# Patient Record
Sex: MALE | Race: WHITE | NOT HISPANIC OR LATINO | URBAN - METROPOLITAN AREA
[De-identification: names, ages, dates, MRNs, and addresses within clinical notes are randomized per-mention and may not be internally consistent; named-entity substitution may affect disease eponyms.]

---

## 2022-02-25 ENCOUNTER — EMERGENCY (EMERGENCY)
Facility: HOSPITAL | Age: 42
LOS: 1 days | Discharge: HOME | End: 2022-02-25
Attending: PERSONAL EMERGENCY RESPONSE ATTENDANT
Payer: MEDICARE

## 2022-02-25 VITALS
WEIGHT: 242.95 LBS | RESPIRATION RATE: 18 BRPM | OXYGEN SATURATION: 98 % | DIASTOLIC BLOOD PRESSURE: 76 MMHG | SYSTOLIC BLOOD PRESSURE: 117 MMHG | HEART RATE: 109 BPM | TEMPERATURE: 98 F | HEIGHT: 71 IN

## 2022-02-25 PROCEDURE — 99284 EMERGENCY DEPT VISIT MOD MDM: CPT | Mod: GC

## 2022-02-25 NOTE — ED ADULT NURSE NOTE - OBJECTIVE STATEMENT
42 y/o male with hx of Asperger's presents to ED with c/o low hemoglobin. Pt was seen by his PCP today d/t 1 week of weakness, HA, dizziness, and fatigue. Pt's PCP called him tonight referring him to the ED because his hgb level was 6.6  Pt states he had nausea and vomiting today. He denies hematuria, bloody stools, CP, SOB, palpitations, fever, chills. Pt is a&ox4, spontaneous respirations and equal chest rise. Pt appears pale. He is agitated and irritable at baseline d/t Asperger's hx. Mother is at bedside.

## 2022-02-25 NOTE — ED PROVIDER NOTE - NSPTACCESSSVCSAPPTDETAILS_ED_ALL_ED_FT
Diagnosis: Anemia w/ unknown source. Colonoscopy outpatient required. Appt within 3 days.  Also appt with hematology within 3 days.

## 2022-02-25 NOTE — ED PROVIDER NOTE - NSFOLLOWUPCLINICS_GEN_ALL_ED_FT
[facility]  Hematology/Oncology  [address] [id number]  Phone: [phone]  Fax:

## 2022-02-25 NOTE — ED PROVIDER NOTE - PROGRESS NOTE DETAILS
Elias IVY: Pt is stable and ready for discharge. Strict return precautions given. All questions answered. Pt will f/u w/ hematologist and GI doctor. Alan, PGY2: Received care upon signout:  40 y/o male w/ PMH GERD coming in w/ symptomatic anemia found to have an outpatient Hg 6.2 and subsequent repeat ones here are similar. Received one unit of prbc and is awaiting 2nd unit. Denying any changes in stool and is refusing rectal exam. Alan, PGY2: Repeat Hg is 7.4. Discussed w/ patient and patient's mother regarding results and recommending inpatient eval for anemia but they are prefer to seek outpatient management. They follow an outpatient GI and can see their PMD this week. Provided patient with strict return precautions; including worsening fatigue, SOB, chest pain and any changes in his BM. Patient and mother understand and agree w/ plan.

## 2022-02-25 NOTE — ED PROVIDER NOTE - NS ED ROS FT
CONSTITUTIONAL: No fever, weight loss, or fatigue  EYES: No eye pain, visual disturbances, or discharge  ENMT:  No difficulty hearing, tinnitus, vertigo; No sinus or throat pain  NECK: No pain or stiffness  RESPIRATORY: No cough, wheezing, chills or hemoptysis; No shortness of breath  CARDIOVASCULAR: No chest pain, palpitations, dizziness, or leg swelling  GASTROINTESTINAL: No abdominal or epigastric pain. +nausea, +vomiting, no hematemesis; No diarrhea or constipation. No melena or hematochezia.  GENITOURINARY: No dysuria, frequency, hematuria, or incontinence  NEUROLOGICAL: + headaches, no memory loss, loss of strength, numbness, or tremors  SKIN: No itching, burning, rashes, or lesions

## 2022-02-25 NOTE — ED PROVIDER NOTE - NSFOLLOWUPINSTRUCTIONS_ED_ALL_ED_FT
Discharge instructions:    - Please follow up with your Primary Care Doctor within the next 24-48 hours.    - We recommend you follow up with your Gastroenterologist within the next 3 days. Our discharge lounge will contact you.    - We also recommend you follow up with a Hematologist for further evaluation.     -  Take any prescribed medications as instructed:         SEEK IMMEDIATE MEDICAL CARE IF YOU HAVE ANY OF THE FOLLOWING SYMPTOMS: extreme weakness/chest pain/shortness of breath, black or bloody stools, vomiting blood, fainting, fever, or any signs of dehydration.

## 2022-02-25 NOTE — ED PROVIDER NOTE - ATTENDING CONTRIBUTION TO CARE
Attending MD Fontanez.  Agree with above.  PT is a 40 yo male with pmhx of aspberger's and GERD presenting to ED wiht 2 wk hx of generalized malaise, intermittent headache, nausea and vomiting.  Seen at UC today with Hgb of 6.2. Denies diarrhea, constipation, hematochezia, bloody emesis, hematuria.  Exam non-actionable.  PT declining rectal exam but denies overt bleeding from rectum.  No petechia/rash noted.  Planned labs, likely transfusion.

## 2022-02-25 NOTE — ED PROVIDER NOTE - OBJECTIVE STATEMENT
42 y/o male w/ PMH GERD c/o 2 week history of generalized malaise, intermittent headache, nausea, and 3 episodes nausea and vomiting. Was seen at urgent care today with a hemoglobin of 6.2. Denies fevers, chills, dizziness, chest pain, SOB, abdominal pain, dysuria, hematuria, dyschezia, and hematochezia.

## 2022-02-25 NOTE — ED ADULT NURSE NOTE - NSIMPLEMENTINTERV_GEN_ALL_ED
Implemented All Universal Safety Interventions:  [place] to call system. Call bell, personal items and telephone within reach. Instruct patient to call for assistance. Room bathroom lighting operational. Non-slip footwear when patient is off stretcher. Physically safe environment: no spills, clutter or unnecessary equipment. Stretcher in lowest position, wheels locked, appropriate side rails in place.

## 2022-02-25 NOTE — ED ADULT NURSE NOTE - FINAL NURSING ELECTRONIC SIGNATURE
Aperture Size (Optional): C
Duration Of Freeze Thaw-Cycle (Seconds): 10
Post-Care Instructions: I reviewed with the patient in detail post-care instructions. Patient is to wear sunprotection, and avoid picking at any of the treated lesions. Pt may apply Vaseline to crusted or scabbing areas.
Render Post-Care Instructions In Note?: yes
Detail Level: Detailed
Medical Necessity Clause: This procedure was medically necessary because the lesions that were treated were:
Medical Necessity Information: It is in your best interest to select a reason for this procedure from the list below. All of these items fulfill various CMS LCD requirements except the new and changing color options.
Add 52 Modifier (Optional): no
Number Of Freeze-Thaw Cycles: 2 freeze-thaw cycles
Consent: The patient's consent was obtained including but not limited to risks of crusting, scabbing, blistering, scarring, darker or lighter pigmentary change, recurrence, incomplete removal and infection.
26-Feb-2022 09:55

## 2022-02-25 NOTE — ED PROVIDER NOTE - PHYSICAL EXAMINATION
GENERAL: well appearing in no acute distress, non-toxic appearing  HEAD: normocephalic, atraumatic  HEENT: normal conjunctiva, oral mucosa moist, uvula midline, no tonsilar exudates, neck supple, no JVD  CARDIAC: regular rate and rhythm, normal S1S2, no appreciable murmurs  PULM: normal breath sounds, clear to ascultation bilaterally, no rales, rhonchi, wheezing  GI: abdomen nondistended, soft, nontender, no guarding, rebound tenderness  : no CVA tenderness b/l, no suprapubic tenderness  NEURO: no focal motor or sensory deficits, CN2-12 intact, normal speech, PERRLA, EOMI, antalgic gait that is normal according to mother, AAOx3  MSK: no peripheral edema, no calf tenderness b/l  SKIN: extremities warm, no visible rashes GENERAL: well appearing in no acute distress, non-toxic appearing  HEAD: normocephalic, atraumatic  HEENT: normal conjunctiva, oral mucosa moist, uvula midline, no tonsilar exudates, neck supple, no JVD  CARDIAC: regular rate and rhythm, normal S1S2, no appreciable murmurs  PULM: normal breath sounds, clear to ascultation bilaterally, no rales, rhonchi, wheezing  GI: abdomen nondistended, soft, nontender, no guarding, rebound tenderness  : no CVA tenderness b/l, no suprapubic tenderness  NEURO: no focal motor or sensory deficits, CN2-12 intact, normal speech, PERRLA, EOMI, antalgic gait that is normal according to mother, AAOx3  MSK: no peripheral edema, no calf tenderness b/l  SKIN: extremities warm, no visible rashes  *Pt declined a rectal exam

## 2022-02-25 NOTE — ED PROVIDER NOTE - CLINICAL SUMMARY MEDICAL DECISION MAKING FREE TEXT BOX
40 y/o male w/ PMH GERD c/o 2 week history of constitutional symptoms w/ hgb 6.2. Will screen for anemia, electrolyte, and type and screen for blood transfusion if necessary and reassess.

## 2022-02-25 NOTE — ED PROVIDER NOTE - PATIENT PORTAL LINK FT
You can access the FollowMyHealth Patient Portal offered by [facility] by registering at the following website: [email or web address]. By joining [facility]’s FollowMyHealth portal, you will also be able to view your health information using other applications (apps) compatible with our system.

## 2022-02-26 VITALS
HEART RATE: 83 BPM | OXYGEN SATURATION: 98 % | TEMPERATURE: 98 F | SYSTOLIC BLOOD PRESSURE: 115 MMHG | DIASTOLIC BLOOD PRESSURE: 75 MMHG | RESPIRATION RATE: 18 BRPM

## 2022-02-26 LAB
ALBUMIN SERPL ELPH-MCNC: 4.5 G/DL — SIGNIFICANT CHANGE UP (ref 3.3–5)
ALP SERPL-CCNC: 60 U/L — SIGNIFICANT CHANGE UP (ref 40–120)
ALT FLD-CCNC: 25 U/L — SIGNIFICANT CHANGE UP (ref 10–45)
ANION GAP SERPL CALC-SCNC: 12 MMOL/L — SIGNIFICANT CHANGE UP (ref 5–17)
ANISOCYTOSIS BLD QL: SLIGHT — SIGNIFICANT CHANGE UP
APPEARANCE UR: ABNORMAL
APTT BLD: 28.5 SEC — SIGNIFICANT CHANGE UP (ref 27.5–35.5)
AST SERPL-CCNC: 15 U/L — SIGNIFICANT CHANGE UP (ref 10–40)
BACTERIA # UR AUTO: NEGATIVE — SIGNIFICANT CHANGE UP
BASOPHILS # BLD AUTO: 0.04 K/UL — SIGNIFICANT CHANGE UP (ref 0–0.2)
BASOPHILS # BLD AUTO: 0.05 K/UL — SIGNIFICANT CHANGE UP (ref 0–0.2)
BASOPHILS # BLD AUTO: 0.09 K/UL — SIGNIFICANT CHANGE UP (ref 0–0.2)
BASOPHILS NFR BLD AUTO: 0.4 % — SIGNIFICANT CHANGE UP (ref 0–2)
BASOPHILS NFR BLD AUTO: 0.5 % — SIGNIFICANT CHANGE UP (ref 0–2)
BASOPHILS NFR BLD AUTO: 0.9 % — SIGNIFICANT CHANGE UP (ref 0–2)
BILIRUB SERPL-MCNC: 0.3 MG/DL — SIGNIFICANT CHANGE UP (ref 0.2–1.2)
BILIRUB UR-MCNC: NEGATIVE — SIGNIFICANT CHANGE UP
BLD GP AB SCN SERPL QL: NEGATIVE — SIGNIFICANT CHANGE UP
BUN SERPL-MCNC: 20 MG/DL — SIGNIFICANT CHANGE UP (ref 7–23)
CALCIUM SERPL-MCNC: 9.2 MG/DL — SIGNIFICANT CHANGE UP (ref 8.4–10.5)
CHLORIDE SERPL-SCNC: 105 MMOL/L — SIGNIFICANT CHANGE UP (ref 96–108)
CO2 SERPL-SCNC: 23 MMOL/L — SIGNIFICANT CHANGE UP (ref 22–31)
COLOR SPEC: YELLOW — SIGNIFICANT CHANGE UP
CREAT SERPL-MCNC: 1.18 MG/DL — SIGNIFICANT CHANGE UP (ref 0.5–1.3)
DACRYOCYTES BLD QL SMEAR: SLIGHT — SIGNIFICANT CHANGE UP
DIFF PNL FLD: NEGATIVE — SIGNIFICANT CHANGE UP
ELLIPTOCYTES BLD QL SMEAR: SLIGHT — SIGNIFICANT CHANGE UP
EOSINOPHIL # BLD AUTO: 0 K/UL — SIGNIFICANT CHANGE UP (ref 0–0.5)
EOSINOPHIL # BLD AUTO: 0.18 K/UL — SIGNIFICANT CHANGE UP (ref 0–0.5)
EOSINOPHIL # BLD AUTO: 0.19 K/UL — SIGNIFICANT CHANGE UP (ref 0–0.5)
EOSINOPHIL NFR BLD AUTO: 0 % — SIGNIFICANT CHANGE UP (ref 0–6)
EOSINOPHIL NFR BLD AUTO: 1.9 % — SIGNIFICANT CHANGE UP (ref 0–6)
EOSINOPHIL NFR BLD AUTO: 2 % — SIGNIFICANT CHANGE UP (ref 0–6)
EPI CELLS # UR: 0 /HPF — SIGNIFICANT CHANGE UP
GIANT PLATELETS BLD QL SMEAR: PRESENT — SIGNIFICANT CHANGE UP
GLUCOSE SERPL-MCNC: 95 MG/DL — SIGNIFICANT CHANGE UP (ref 70–99)
GLUCOSE UR QL: NEGATIVE — SIGNIFICANT CHANGE UP
HCT VFR BLD CALC: 21 % — CRITICAL LOW (ref 39–50)
HCT VFR BLD CALC: 21.4 % — LOW (ref 39–50)
HCT VFR BLD CALC: 24.2 % — LOW (ref 39–50)
HGB BLD-MCNC: 6.3 G/DL — CRITICAL LOW (ref 13–17)
HGB BLD-MCNC: 6.5 G/DL — CRITICAL LOW (ref 13–17)
HGB BLD-MCNC: 7.4 G/DL — LOW (ref 13–17)
HYALINE CASTS # UR AUTO: 1 /LPF — SIGNIFICANT CHANGE UP (ref 0–2)
HYPOCHROMIA BLD QL: SLIGHT — SIGNIFICANT CHANGE UP
IMM GRANULOCYTES NFR BLD AUTO: 0.4 % — SIGNIFICANT CHANGE UP (ref 0–1.5)
IMM GRANULOCYTES NFR BLD AUTO: 0.5 % — SIGNIFICANT CHANGE UP (ref 0–1.5)
INR BLD: 0.99 RATIO — SIGNIFICANT CHANGE UP (ref 0.88–1.16)
KETONES UR-MCNC: SIGNIFICANT CHANGE UP
LEUKOCYTE ESTERASE UR-ACNC: NEGATIVE — SIGNIFICANT CHANGE UP
LYMPHOCYTES # BLD AUTO: 2.56 K/UL — SIGNIFICANT CHANGE UP (ref 1–3.3)
LYMPHOCYTES # BLD AUTO: 2.93 K/UL — SIGNIFICANT CHANGE UP (ref 1–3.3)
LYMPHOCYTES # BLD AUTO: 26.3 % — SIGNIFICANT CHANGE UP (ref 13–44)
LYMPHOCYTES # BLD AUTO: 3 K/UL — SIGNIFICANT CHANGE UP (ref 1–3.3)
LYMPHOCYTES # BLD AUTO: 31 % — SIGNIFICANT CHANGE UP (ref 13–44)
LYMPHOCYTES # BLD AUTO: 31.7 % — SIGNIFICANT CHANGE UP (ref 13–44)
MANUAL SMEAR VERIFICATION: SIGNIFICANT CHANGE UP
MCHC RBC-ENTMCNC: 24 PG — LOW (ref 27–34)
MCHC RBC-ENTMCNC: 24.8 PG — LOW (ref 27–34)
MCHC RBC-ENTMCNC: 25.3 PG — LOW (ref 27–34)
MCHC RBC-ENTMCNC: 30 GM/DL — LOW (ref 32–36)
MCHC RBC-ENTMCNC: 30.4 GM/DL — LOW (ref 32–36)
MCHC RBC-ENTMCNC: 30.6 GM/DL — LOW (ref 32–36)
MCV RBC AUTO: 79.8 FL — LOW (ref 80–100)
MCV RBC AUTO: 81.7 FL — SIGNIFICANT CHANGE UP (ref 80–100)
MCV RBC AUTO: 82.9 FL — SIGNIFICANT CHANGE UP (ref 80–100)
MICROCYTES BLD QL: SLIGHT — SIGNIFICANT CHANGE UP
MONOCYTES # BLD AUTO: 0.55 K/UL — SIGNIFICANT CHANGE UP (ref 0–0.9)
MONOCYTES # BLD AUTO: 0.56 K/UL — SIGNIFICANT CHANGE UP (ref 0–0.9)
MONOCYTES # BLD AUTO: 0.68 K/UL — SIGNIFICANT CHANGE UP (ref 0–0.9)
MONOCYTES NFR BLD AUTO: 5.8 % — SIGNIFICANT CHANGE UP (ref 2–14)
MONOCYTES NFR BLD AUTO: 5.9 % — SIGNIFICANT CHANGE UP (ref 2–14)
MONOCYTES NFR BLD AUTO: 7 % — SIGNIFICANT CHANGE UP (ref 2–14)
NEUTROPHILS # BLD AUTO: 5.63 K/UL — SIGNIFICANT CHANGE UP (ref 1.8–7.4)
NEUTROPHILS # BLD AUTO: 5.69 K/UL — SIGNIFICANT CHANGE UP (ref 1.8–7.4)
NEUTROPHILS # BLD AUTO: 6.41 K/UL — SIGNIFICANT CHANGE UP (ref 1.8–7.4)
NEUTROPHILS NFR BLD AUTO: 59.6 % — SIGNIFICANT CHANGE UP (ref 43–77)
NEUTROPHILS NFR BLD AUTO: 60.3 % — SIGNIFICANT CHANGE UP (ref 43–77)
NEUTROPHILS NFR BLD AUTO: 65.8 % — SIGNIFICANT CHANGE UP (ref 43–77)
NITRITE UR-MCNC: NEGATIVE — SIGNIFICANT CHANGE UP
NRBC # BLD: 0 /100 WBCS — SIGNIFICANT CHANGE UP (ref 0–0)
NRBC # BLD: 0 /100 WBCS — SIGNIFICANT CHANGE UP (ref 0–0)
OVALOCYTES BLD QL SMEAR: SLIGHT — SIGNIFICANT CHANGE UP
PH UR: 5.5 — SIGNIFICANT CHANGE UP (ref 5–8)
PLAT MORPH BLD: ABNORMAL
PLATELET # BLD AUTO: 188 K/UL — SIGNIFICANT CHANGE UP (ref 150–400)
PLATELET # BLD AUTO: 244 K/UL — SIGNIFICANT CHANGE UP (ref 150–400)
PLATELET # BLD AUTO: 291 K/UL — SIGNIFICANT CHANGE UP (ref 150–400)
POLYCHROMASIA BLD QL SMEAR: SLIGHT — SIGNIFICANT CHANGE UP
POTASSIUM SERPL-MCNC: 4 MMOL/L — SIGNIFICANT CHANGE UP (ref 3.5–5.3)
POTASSIUM SERPL-SCNC: 4 MMOL/L — SIGNIFICANT CHANGE UP (ref 3.5–5.3)
PROT SERPL-MCNC: 6.8 G/DL — SIGNIFICANT CHANGE UP (ref 6–8.3)
PROT UR-MCNC: ABNORMAL
PROTHROM AB SERPL-ACNC: 11.9 SEC — SIGNIFICANT CHANGE UP (ref 10.5–13.4)
RBC # BLD: 2.62 M/UL — LOW (ref 4.2–5.8)
RBC # BLD: 2.63 M/UL — LOW (ref 4.2–5.8)
RBC # BLD: 2.92 M/UL — LOW (ref 4.2–5.8)
RBC # FLD: 15.8 % — HIGH (ref 10.3–14.5)
RBC # FLD: 16.5 % — HIGH (ref 10.3–14.5)
RBC # FLD: 16.6 % — HIGH (ref 10.3–14.5)
RBC BLD AUTO: ABNORMAL
RBC CASTS # UR COMP ASSIST: 0 /HPF — SIGNIFICANT CHANGE UP (ref 0–4)
RH IG SCN BLD-IMP: NEGATIVE — SIGNIFICANT CHANGE UP
SODIUM SERPL-SCNC: 140 MMOL/L — SIGNIFICANT CHANGE UP (ref 135–145)
SP GR SPEC: 1.03 — HIGH (ref 1.01–1.02)
UROBILINOGEN FLD QL: NEGATIVE — SIGNIFICANT CHANGE UP
WBC # BLD: 9.45 K/UL — SIGNIFICANT CHANGE UP (ref 3.8–10.5)
WBC # BLD: 9.46 K/UL — SIGNIFICANT CHANGE UP (ref 3.8–10.5)
WBC # BLD: 9.74 K/UL — SIGNIFICANT CHANGE UP (ref 3.8–10.5)
WBC # FLD AUTO: 9.45 K/UL — SIGNIFICANT CHANGE UP (ref 3.8–10.5)
WBC # FLD AUTO: 9.46 K/UL — SIGNIFICANT CHANGE UP (ref 3.8–10.5)
WBC # FLD AUTO: 9.74 K/UL — SIGNIFICANT CHANGE UP (ref 3.8–10.5)
WBC UR QL: 0 /HPF — SIGNIFICANT CHANGE UP (ref 0–5)

## 2022-02-26 PROCEDURE — 81001 URINALYSIS AUTO W/SCOPE: CPT

## 2022-02-26 PROCEDURE — 86850 RBC ANTIBODY SCREEN: CPT

## 2022-02-26 PROCEDURE — 86900 BLOOD TYPING SEROLOGIC ABO: CPT

## 2022-02-26 PROCEDURE — 36430 TRANSFUSION BLD/BLD COMPNT: CPT

## 2022-02-26 PROCEDURE — 85730 THROMBOPLASTIN TIME PARTIAL: CPT

## 2022-02-26 PROCEDURE — P9016: CPT

## 2022-02-26 PROCEDURE — 85610 PROTHROMBIN TIME: CPT

## 2022-02-26 PROCEDURE — 93005 ELECTROCARDIOGRAM TRACING: CPT

## 2022-02-26 PROCEDURE — 86923 COMPATIBILITY TEST ELECTRIC: CPT

## 2022-02-26 PROCEDURE — 99285 EMERGENCY DEPT VISIT HI MDM: CPT | Mod: 25

## 2022-02-26 PROCEDURE — 36415 COLL VENOUS BLD VENIPUNCTURE: CPT

## 2022-02-26 PROCEDURE — 86901 BLOOD TYPING SEROLOGIC RH(D): CPT

## 2022-02-26 PROCEDURE — 85025 COMPLETE CBC W/AUTO DIFF WBC: CPT

## 2022-02-26 PROCEDURE — 82248 BILIRUBIN DIRECT: CPT

## 2022-02-26 PROCEDURE — 80053 COMPREHEN METABOLIC PANEL: CPT

## 2022-02-26 PROCEDURE — 87086 URINE CULTURE/COLONY COUNT: CPT

## 2022-02-26 NOTE — ED ADULT NURSE REASSESSMENT NOTE - NS ED NURSE REASSESS COMMENT FT1
Pt's mother reports to nurse that pt states he feels lightheaded. RN reports to bedside. Blood transfusion stopped and pt's vitals are taken. VSS on RA. Pt states "I'm not sure if I feel lightheaded... maybe it's because I have been up for a long time." Pt denies dizziness, HA, fever, hives, CP, SOB, throat/tongue swelling. RN reassured pt that his vitals are stable and he does not appear to be having an adverse reaction to the blood transfusion. RN restarted the blood transfusion and informed pt to call if he experiences any adverse effects.

## 2022-02-26 NOTE — ED ADULT NURSE REASSESSMENT NOTE - NS ED NURSE REASSESS COMMENT FT1
Blood transfusion of 1U PRBCs completed at 0357. Pt is a&ox4, spontaneous respirations and equal chest rise. He denies any adverse effects at this time. Will continue to closely monitor pt.

## 2022-02-26 NOTE — ED ADULT NURSE REASSESSMENT NOTE - NS ED NURSE REASSESS COMMENT FT1
Second unit of PRBCs completed. Vitals remain stable on RA. Pt is a&ox4, spontaneous respirations and equal chest rise. Pt denies fever, chills, CP, SOB, itching, n/v/d. Pending repeat CBC results.

## 2022-02-26 NOTE — ED ADULT NURSE REASSESSMENT NOTE - NS ED NURSE REASSESS COMMENT FT1
BLOOD TRANSFUSION: Pt in need of 1U of PRBC's. Blood consent has been signed. Vital signs obtained prior to blood request. 2 RN's at bedside to verify blood. Blood initiated at 0325. This RN remained with pt for the first 15 minutes. Pt denies any fever, chills, SOB, hives, throat or tongue swelling. RN educated pt on calling if he begins to experience any of the stated adverse effects. Pt verbalized his understanding. Pt's mother remains at the bedside.

## 2022-02-26 NOTE — ED ADULT NURSE REASSESSMENT NOTE - NS ED NURSE REASSESS COMMENT FT1
report received from Bozena RN, pt A&Ox3, sitting up in bed on cellphone, pt answering in yes and no responses no eye contact, breath sounds clear bilaterally, skin warm and dry, mother at bedside, safety and comfort provided.

## 2022-02-27 LAB
CULTURE RESULTS: SIGNIFICANT CHANGE UP
SPECIMEN SOURCE: SIGNIFICANT CHANGE UP

## 2022-03-08 ENCOUNTER — OUTPATIENT (OUTPATIENT)
Dept: OUTPATIENT SERVICES | Facility: HOSPITAL | Age: 42
LOS: 1 days | Discharge: HOME | End: 2022-03-08

## 2022-03-08 DIAGNOSIS — D64.9 ANEMIA, UNSPECIFIED: ICD-10-CM

## 2022-03-09 ENCOUNTER — RESULT REVIEW (OUTPATIENT)
Age: 42
End: 2022-03-09

## 2022-03-09 ENCOUNTER — APPOINTMENT (OUTPATIENT)
Dept: HEMATOLOGY ONCOLOGY | Facility: CLINIC | Age: 42
End: 2022-03-09
Payer: MEDICARE

## 2022-03-09 ENCOUNTER — NON-APPOINTMENT (OUTPATIENT)
Age: 42
End: 2022-03-09

## 2022-03-09 ENCOUNTER — OUTPATIENT (OUTPATIENT)
Dept: OUTPATIENT SERVICES | Facility: HOSPITAL | Age: 42
LOS: 1 days | End: 2022-03-09
Payer: MEDICARE

## 2022-03-09 ENCOUNTER — APPOINTMENT (OUTPATIENT)
Dept: HEMATOLOGY ONCOLOGY | Facility: CLINIC | Age: 42
End: 2022-03-09

## 2022-03-09 VITALS
HEART RATE: 96 BPM | DIASTOLIC BLOOD PRESSURE: 76 MMHG | WEIGHT: 246.91 LBS | TEMPERATURE: 98.1 F | RESPIRATION RATE: 18 BRPM | HEIGHT: 70.47 IN | BODY MASS INDEX: 34.96 KG/M2 | OXYGEN SATURATION: 96 % | SYSTOLIC BLOOD PRESSURE: 123 MMHG

## 2022-03-09 DIAGNOSIS — D64.9 ANEMIA, UNSPECIFIED: ICD-10-CM

## 2022-03-09 DIAGNOSIS — Z78.9 OTHER SPECIFIED HEALTH STATUS: ICD-10-CM

## 2022-03-09 LAB
ANISOCYTOSIS BLD QL: SLIGHT — SIGNIFICANT CHANGE UP
BASOPHILS # BLD AUTO: 0.06 K/UL — SIGNIFICANT CHANGE UP (ref 0–0.2)
BASOPHILS NFR BLD AUTO: 0.7 % — SIGNIFICANT CHANGE UP (ref 0–2)
DACRYOCYTES BLD QL SMEAR: SLIGHT — SIGNIFICANT CHANGE UP
EOSINOPHIL # BLD AUTO: 0.14 K/UL — SIGNIFICANT CHANGE UP (ref 0–0.5)
EOSINOPHIL NFR BLD AUTO: 1.7 % — SIGNIFICANT CHANGE UP (ref 0–6)
HCT VFR BLD CALC: 28 % — LOW (ref 39–50)
HGB BLD-MCNC: 8.5 G/DL — LOW (ref 13–17)
HYPOCHROMIA BLD QL: SLIGHT — SIGNIFICANT CHANGE UP
IMM GRANULOCYTES NFR BLD AUTO: 0.4 % — SIGNIFICANT CHANGE UP (ref 0–1.5)
LYMPHOCYTES # BLD AUTO: 2.49 K/UL — SIGNIFICANT CHANGE UP (ref 1–3.3)
LYMPHOCYTES # BLD AUTO: 29.6 % — SIGNIFICANT CHANGE UP (ref 13–44)
MCHC RBC-ENTMCNC: 23 PG — LOW (ref 27–34)
MCHC RBC-ENTMCNC: 30.4 G/DL — LOW (ref 32–36)
MCV RBC AUTO: 75.9 FL — LOW (ref 80–100)
MONOCYTES # BLD AUTO: 0.5 K/UL — SIGNIFICANT CHANGE UP (ref 0–0.9)
MONOCYTES NFR BLD AUTO: 6 % — SIGNIFICANT CHANGE UP (ref 2–14)
NEUTROPHILS # BLD AUTO: 5.18 K/UL — SIGNIFICANT CHANGE UP (ref 1.8–7.4)
NEUTROPHILS NFR BLD AUTO: 61.6 % — SIGNIFICANT CHANGE UP (ref 43–77)
NRBC # BLD: 0 /100 WBCS — SIGNIFICANT CHANGE UP (ref 0–0)
PLAT MORPH BLD: NORMAL — SIGNIFICANT CHANGE UP
PLATELET # BLD AUTO: 210 K/UL — SIGNIFICANT CHANGE UP (ref 150–400)
POIKILOCYTOSIS BLD QL AUTO: SLIGHT — SIGNIFICANT CHANGE UP
RBC # BLD: 3.69 M/UL — LOW (ref 4.2–5.8)
RBC # FLD: 22.5 % — HIGH (ref 10.3–14.5)
RBC BLD AUTO: ABNORMAL
RETICS #: 58 K/UL — SIGNIFICANT CHANGE UP (ref 25–125)
RETICS/RBC NFR: 1.5 % — SIGNIFICANT CHANGE UP (ref 0.5–2.5)
SCHISTOCYTES BLD QL AUTO: SLIGHT — SIGNIFICANT CHANGE UP
WBC # BLD: 8.4 K/UL — SIGNIFICANT CHANGE UP (ref 3.8–10.5)
WBC # FLD AUTO: 8.4 K/UL — SIGNIFICANT CHANGE UP (ref 3.8–10.5)

## 2022-03-09 PROCEDURE — 99204 OFFICE O/P NEW MOD 45 MIN: CPT

## 2022-03-09 PROCEDURE — 86850 RBC ANTIBODY SCREEN: CPT

## 2022-03-09 PROCEDURE — 86901 BLOOD TYPING SEROLOGIC RH(D): CPT

## 2022-03-09 PROCEDURE — 86900 BLOOD TYPING SEROLOGIC ABO: CPT

## 2022-03-10 ENCOUNTER — TRANSCRIPTION ENCOUNTER (OUTPATIENT)
Age: 42
End: 2022-03-10

## 2022-03-10 LAB
ALBUMIN SERPL ELPH-MCNC: 4.6 G/DL
ALP BLD-CCNC: 61 U/L
ALT SERPL-CCNC: 27 U/L
ANION GAP SERPL CALC-SCNC: 13 MMOL/L
AST SERPL-CCNC: 14 U/L
BILIRUB SERPL-MCNC: 0.2 MG/DL
BUN SERPL-MCNC: 24 MG/DL
CALCIUM SERPL-MCNC: 9.1 MG/DL
CHLORIDE SERPL-SCNC: 108 MMOL/L
CO2 SERPL-SCNC: 22 MMOL/L
CREAT SERPL-MCNC: 1.08 MG/DL
EGFR: 90 ML/MIN/1.73M2
FERRITIN SERPL-MCNC: 4 NG/ML
FOLATE SERPL-MCNC: 13.5 NG/ML
GLUCOSE SERPL-MCNC: 85 MG/DL
HAPTOGLOB SERPL-MCNC: 148 MG/DL
IRON SATN MFR SERPL: 3 %
IRON SERPL-MCNC: 13 UG/DL
LDH SERPL-CCNC: 147 U/L
POTASSIUM SERPL-SCNC: 4.2 MMOL/L
PROT SERPL-MCNC: 6.9 G/DL
SODIUM SERPL-SCNC: 142 MMOL/L
TIBC SERPL-MCNC: 408 UG/DL
UIBC SERPL-MCNC: 396 UG/DL
VIT B12 SERPL-MCNC: 440 PG/ML

## 2022-03-13 ENCOUNTER — APPOINTMENT (OUTPATIENT)
Dept: HEMATOLOGY ONCOLOGY | Facility: CLINIC | Age: 42
End: 2022-03-13
Payer: MEDICARE

## 2022-03-13 LAB
HGB A MFR BLD: 97.9 %
HGB A2 MFR BLD: 2.1 %
HGB FRACT BLD-IMP: NORMAL

## 2022-03-13 PROCEDURE — 99443: CPT | Mod: 95

## 2022-03-18 ENCOUNTER — TRANSCRIPTION ENCOUNTER (OUTPATIENT)
Age: 42
End: 2022-03-18

## 2022-03-22 PROBLEM — Z78.9 DENIES ALCOHOL CONSUMPTION: Status: ACTIVE | Noted: 2022-03-22

## 2022-03-22 PROBLEM — D64.9 ANEMIA: Status: ACTIVE | Noted: 2022-03-22

## 2022-03-22 RX ORDER — OMEPRAZOLE 20 MG/1
TABLET, DELAYED RELEASE ORAL
Refills: 0 | Status: ACTIVE | COMMUNITY

## 2022-03-22 NOTE — RESULTS/DATA
[FreeTextEntry1] : CBC today\par WBC 8.40\par HGB 8.5\par HCT 28.0\par ,000\par MCV 75.9\par RDW 22.5%\par

## 2022-03-22 NOTE — HISTORY OF PRESENT ILLNESS
[de-identified] : Initial Consultation on 3/9/2022\par Referred by: [facility] ED\par Accompanied by: Mother\par CC: Anemia\par \par HPI:\par 41 year old man with history of GERD and Aspergers here for evaluation of anemia.  Patient reports a two week history of generalized malaise, intermittent headaches and one episode of brown emesis and two other episodes of dry heaving.  He was evaluated by his primary care physician on 2/25/2022 and found to have a hemoglobin of 6.2.  He was advised to go to ED for evaluation.  He was given 2 units of packed RBCs to bring his HGB/HCT to 7.4/24.2 respectively.  Patient refused further work-up in the hospital.  He saw GI and underwent an endoscopy on 3/3/2022 that showed no source of bleeding.  He refused colonoscopy. Patient denies taking oral or IV iron. \par \par Patient c/o intermittent fatigue.  He reports a good appetite without recent weight loss.  Patient denies any fever/chills, recent infections, CP, SOB, palpitations, abdominal pain, current reflux symptoms, further n/v/d, bloody/black stools, frequent headaches or dizziness.\par \par PMHx:\par Chronic prostatitis\par GERD\par Aspergers\par \par PSHx:\par Hernia repair\par \par Hospitalizations:\par ED visit 2/25-2/26/2022\par \par Medications:\par See List.  Medications reconciled\par Omeprazole BID\par \par Allergies:\par Amoxicillin\par Grass, Trees, Mold\par \par Social History:\par Denies children. \par Does not work. \par Denies alcohol or smoking use. Lives with parents. \par Eats regular diet \par Born in [place] Parents were born [place]par  \par Family History:\par Denies family history of anemia, leukemia, lymphoma or other blood disorders. \par \par Healthcare Maintenance:\par Primary care doctor- Dr. Alex Wright- last seen 2/23/2022 \par Denies colonoscopy\par Last endoscopy- 3/3/2022\par Received three doses of COVID vaccine\par Denies history of COVID infection [de-identified] : Initial Visit

## 2022-03-22 NOTE — PHYSICAL EXAM
[Fully active, able to carry on all pre-disease performance without restriction] : Status 0 - Fully active, able to carry on all pre-disease performance without restriction [Normal] : affect appropriate [de-identified] : No cervical, axillary or inguinal LAD noted

## 2022-03-22 NOTE — ASSESSMENT
[FreeTextEntry1] : 41 year old man with history of GERD and Aspergers here for evaluation of anemia.  Patient was recently seen in ED for HGB of 6.2 after a two week history of malaise, nausea and brown emesis.   He was given 2 units of packed RBCs to bring his HGB/HCT to 7.4/24.2 respectively.  Patient refused further work-up in the hospital.  He saw GI and underwent an endoscopy on 3/3/2022 that showed no source of bleeding.  He refused colonoscopy.\par \par Plan:\par Will check CBC, CMP, LFTs, iron studies, Ferritin, B12, Folic Acid, LDH, haptoglobin, reticulocyte count and hemoglobin electrophoresis. \par Will consider CT abdomen/pelvis if work-up unrevealing. \par Case and management discussed with Dr. Ochoa. \par

## 2022-04-18 ENCOUNTER — OUTPATIENT (OUTPATIENT)
Dept: OUTPATIENT SERVICES | Facility: HOSPITAL | Age: 42
LOS: 1 days | Discharge: HOME | End: 2022-04-18

## 2022-04-18 DIAGNOSIS — D64.9 ANEMIA, UNSPECIFIED: ICD-10-CM

## 2022-04-21 ENCOUNTER — RESULT REVIEW (OUTPATIENT)
Age: 42
End: 2022-04-21

## 2022-04-21 ENCOUNTER — APPOINTMENT (OUTPATIENT)
Dept: HEMATOLOGY ONCOLOGY | Facility: CLINIC | Age: 42
End: 2022-04-21
Payer: MEDICARE

## 2022-04-21 VITALS
HEART RATE: 91 BPM | TEMPERATURE: 97.3 F | SYSTOLIC BLOOD PRESSURE: 112 MMHG | OXYGEN SATURATION: 96 % | RESPIRATION RATE: 16 BRPM | DIASTOLIC BLOOD PRESSURE: 69 MMHG | HEIGHT: 70.47 IN | WEIGHT: 246.92 LBS | BODY MASS INDEX: 34.96 KG/M2

## 2022-04-21 DIAGNOSIS — D64.9 ANEMIA, UNSPECIFIED: ICD-10-CM

## 2022-04-21 LAB
BASOPHILS # BLD AUTO: 0.08 K/UL — SIGNIFICANT CHANGE UP (ref 0–0.2)
BASOPHILS NFR BLD AUTO: 0.9 % — SIGNIFICANT CHANGE UP (ref 0–2)
EOSINOPHIL # BLD AUTO: 0.15 K/UL — SIGNIFICANT CHANGE UP (ref 0–0.5)
EOSINOPHIL NFR BLD AUTO: 1.7 % — SIGNIFICANT CHANGE UP (ref 0–6)
HCT VFR BLD CALC: 37.6 % — LOW (ref 39–50)
HGB BLD-MCNC: 11.2 G/DL — LOW (ref 13–17)
IMM GRANULOCYTES NFR BLD AUTO: 0.3 % — SIGNIFICANT CHANGE UP (ref 0–1.5)
LYMPHOCYTES # BLD AUTO: 2.17 K/UL — SIGNIFICANT CHANGE UP (ref 1–3.3)
LYMPHOCYTES # BLD AUTO: 24 % — SIGNIFICANT CHANGE UP (ref 13–44)
MCHC RBC-ENTMCNC: 21.4 PG — LOW (ref 27–34)
MCHC RBC-ENTMCNC: 29.8 G/DL — LOW (ref 32–36)
MCV RBC AUTO: 71.8 FL — LOW (ref 80–100)
MONOCYTES # BLD AUTO: 0.58 K/UL — SIGNIFICANT CHANGE UP (ref 0–0.9)
MONOCYTES NFR BLD AUTO: 6.4 % — SIGNIFICANT CHANGE UP (ref 2–14)
NEUTROPHILS # BLD AUTO: 6.03 K/UL — SIGNIFICANT CHANGE UP (ref 1.8–7.4)
NEUTROPHILS NFR BLD AUTO: 66.7 % — SIGNIFICANT CHANGE UP (ref 43–77)
NRBC # BLD: 0 /100 WBCS — SIGNIFICANT CHANGE UP (ref 0–0)
PLATELET # BLD AUTO: 253 K/UL — SIGNIFICANT CHANGE UP (ref 150–400)
RBC # BLD: 5.24 M/UL — SIGNIFICANT CHANGE UP (ref 4.2–5.8)
RBC # FLD: 25.1 % — HIGH (ref 10.3–14.5)
WBC # BLD: 9.04 K/UL — SIGNIFICANT CHANGE UP (ref 3.8–10.5)
WBC # FLD AUTO: 9.04 K/UL — SIGNIFICANT CHANGE UP (ref 3.8–10.5)

## 2022-04-21 PROCEDURE — 99214 OFFICE O/P EST MOD 30 MIN: CPT

## 2022-04-21 RX ORDER — CHLORHEXIDINE GLUCONATE 4 %
325 (65 FE) LIQUID (ML) TOPICAL
Qty: 30 | Refills: 2 | Status: ACTIVE | COMMUNITY
Start: 2022-04-21 | End: 1900-01-01

## 2022-04-21 RX ORDER — ASCORBIC ACID 500 MG
500 TABLET ORAL
Qty: 30 | Refills: 0 | Status: ACTIVE | COMMUNITY
Start: 2022-04-21 | End: 1900-01-01

## 2022-04-22 LAB
FERRITIN SERPL-MCNC: 23 NG/ML
IRON SATN MFR SERPL: 92 %
IRON SERPL-MCNC: 325 UG/DL
TIBC SERPL-MCNC: 351 UG/DL

## 2022-04-25 NOTE — ASSESSMENT
[Supportive] : Goals of care discussed with patient: Supportive [Palliative Care Plan] : not applicable at this time [FreeTextEntry1] : 41 year old man with history of GERD and Aspergers here for evaluation of anemia.  Patient was recently seen in ED for HGB of 6.2 after a two week history of malaise, nausea and brown emesis.   He was given 2 units of packed RBCs to bring his HGB/HCT to 7.4/24.2 respectively.  Patient refused further work-up in the hospital.  He saw GI and underwent an endoscopy on 3/3/2022 that showed no source of bleeding.  He refused colonoscopy.\par \par Plan:\par Will check CBC, CMP, LFTs, iron studies, Ferritin, B12, Folic Acid, LDH, haptoglobin, reticulocyte count and hemoglobin electrophoresis. \par Will consider CT abdomen/pelvis if work-up unrevealing. \par Case and management discussed with Dr. Ochoa. \par \par 04/21/2022 according to the patient"... I feel back to normal..." mother tells us that he has been taking oral iron. He has refused other outpatient evaluation for GI blood loss which is now thought to be a gastritis. We will check blood levels today and he does not appear to have signs of orthostasis in vital signs as he is moving well and has normal energy levels. Patient continues to refuse Colonscopy procedure as recommended. \par recommend to continue taking Iron PO as prescribed and to take with Vitamin C (pt unwilling to take with Citrus fruits due to GERD). \par Patient seen and examined with Darby DARLING.\par Not planning on blood transfusion unless absolutely normal\par RTC in 8 weeks\par \par

## 2022-04-25 NOTE — CONSULT LETTER
[Consult Closing:] : Thank you very much for allowing me to participate in the care of this patient.  If you have any questions, please do not hesitate to contact me. [DrRoberto  ___] : Dr. PÉREZ [FreeTextEntry2] : PCP: Dr. Alex Wright - [address] [facility] [address] - Phone: [phone]\par GI - Dr Diego Rosario  [address] [facility], [address], [facility] [address] [phone]\par Fax: [phone] [FreeTextEntry3] : GI - Dr Diego Rosario  [address] [place] [address], [address], [facility] [address] [phone]\par Fax: [phone]

## 2022-04-25 NOTE — PHYSICAL EXAM
[Fully active, able to carry on all pre-disease performance without restriction] : Status 0 - Fully active, able to carry on all pre-disease performance without restriction [Normal] : affect appropriate [de-identified] : No cervical, axillary or inguinal LAD noted

## 2022-04-25 NOTE — REASON FOR VISIT
[Initial Consultation] : an initial consultation for [Blood Count Assessment] : blood count assessment [Parent] : parent [Other: _____] : [unfilled] [FreeTextEntry2] : Anemia

## 2022-04-25 NOTE — HISTORY OF PRESENT ILLNESS
[Date: ____________] : Patient's last distress assessment performed on [unfilled]. [3 - Distress Level] : Distress Level: 3 [90: Able to carry normal activity; minor signs or symptoms of disease.] : 90: Able to carry normal activity; minor signs or symptoms of disease.  [ECOG Performance Status: 0 - Fully active, able to carry on all pre-disease performance without restriction] : Performance Status: 0 - Fully active, able to carry on all pre-disease performance without restriction [de-identified] : Initial Consultation on 3/9/2022\par Referred by: [facility] ED\par Accompanied by: Mother\par CC: Anemia\par \par HPI:\par 41 year old man with history of GERD and Aspergers here for evaluation of anemia.  Patient reports a two week history of generalized malaise, intermittent headaches and one episode of brown emesis and two other episodes of dry heaving.  He was evaluated by his primary care physician on 2/25/2022 and found to have a hemoglobin of 6.2.  He was advised to go to ED for evaluation.  He was given 2 units of packed RBCs to bring his HGB/HCT to 7.4/24.2 respectively.  Patient refused further work-up in the hospital.  He saw GI and underwent an endoscopy on 3/3/2022 that showed no source of bleeding.  He refused colonoscopy. Patient denies taking oral or IV iron. \par \par Patient c/o intermittent fatigue.  He reports a good appetite without recent weight loss.  Patient denies any fever/chills, recent infections, CP, SOB, palpitations, abdominal pain, current reflux symptoms, further n/v/d, bloody/black stools, frequent headaches or dizziness.\par \par PMHx:\par Chronic prostatitis\par GERD\par Aspergers\par \par PSHx:\par Hernia repair\par \par Hospitalizations:\par ED visit 2/25-2/26/2022\par \par Medications:\par See List.  Medications reconciled\par Omeprazole BID\par \par Allergies:\par Amoxicillin\par Grass, Trees, Mold\par \par Social History:\par Denies children. \par Does not work. \par Denies alcohol or smoking use. Lives with parents. \par Eats regular diet \par Born in [place] Parents were born [place]par  \par Family History:\par Denies family history of anemia, leukemia, lymphoma or other blood disorders. \par \par Healthcare Maintenance:\par Primary care doctor- Dr. Alex Wright- last seen 2/23/2022 \par Denies colonoscopy\par Last endoscopy- 3/3/2022\par Received three doses of COVID vaccine\par Denies history of COVID infection [de-identified] : Initial Visit [FreeTextEntry7] : wants to leave the office. non aggressive

## 2022-04-25 NOTE — REVIEW OF SYSTEMS
[Fatigue] : fatigue [Negative] : Allergic/Immunologic [Confused] : no confusion [Dizziness] : no dizziness [Fainting] : no fainting [Difficulty Walking] : no difficulty walking [Insomnia] : no insomnia [Anxiety] : no anxiety [Depression] : no depression [de-identified] : autistic [de-identified] : disinterested in visit; cooperative; tangental speech

## 2022-04-25 NOTE — RESULTS/DATA
[FreeTextEntry1] : 03/09/CBC WBC 8.40 HGB 8.5 HCT 28.0 ,000 MCV 75.9 RDW 22.5%\par low serum ferritin 4 low iron\par

## 2022-04-29 ENCOUNTER — NON-APPOINTMENT (OUTPATIENT)
Age: 42
End: 2022-04-29

## 2022-06-08 NOTE — ED ADULT NURSE NOTE - NSFALLRSKPASTHIST_ED_ALL_ED
no Skyrizi Counseling: I discussed with the patient the risks of risankizumab-rzaa including but not limited to immunosuppression, and serious infections.  The patient understands that monitoring is required including a PPD at baseline and must alert us or the primary physician if symptoms of infection or other concerning signs are noted.

## 2022-06-17 ENCOUNTER — OUTPATIENT (OUTPATIENT)
Dept: OUTPATIENT SERVICES | Facility: HOSPITAL | Age: 42
LOS: 1 days | Discharge: HOME | End: 2022-06-17

## 2022-06-17 DIAGNOSIS — D64.9 ANEMIA, UNSPECIFIED: ICD-10-CM

## 2022-06-23 ENCOUNTER — RESULT REVIEW (OUTPATIENT)
Age: 42
End: 2022-06-23

## 2022-06-23 ENCOUNTER — APPOINTMENT (OUTPATIENT)
Dept: HEMATOLOGY ONCOLOGY | Facility: CLINIC | Age: 42
End: 2022-06-23
Payer: MEDICARE

## 2022-06-23 ENCOUNTER — NON-APPOINTMENT (OUTPATIENT)
Age: 42
End: 2022-06-23

## 2022-06-23 VITALS
TEMPERATURE: 98.8 F | BODY MASS INDEX: 35.27 KG/M2 | OXYGEN SATURATION: 97 % | SYSTOLIC BLOOD PRESSURE: 125 MMHG | DIASTOLIC BLOOD PRESSURE: 84 MMHG | WEIGHT: 249.12 LBS | HEART RATE: 89 BPM | RESPIRATION RATE: 16 BRPM

## 2022-06-23 DIAGNOSIS — D50.9 IRON DEFICIENCY ANEMIA, UNSPECIFIED: ICD-10-CM

## 2022-06-23 LAB
BASOPHILS # BLD AUTO: 0.06 K/UL — SIGNIFICANT CHANGE UP (ref 0–0.2)
BASOPHILS NFR BLD AUTO: 0.6 % — SIGNIFICANT CHANGE UP (ref 0–2)
EOSINOPHIL # BLD AUTO: 0.11 K/UL — SIGNIFICANT CHANGE UP (ref 0–0.5)
EOSINOPHIL NFR BLD AUTO: 1.2 % — SIGNIFICANT CHANGE UP (ref 0–6)
HCT VFR BLD CALC: 40.4 % — SIGNIFICANT CHANGE UP (ref 39–50)
HGB BLD-MCNC: 13 G/DL — SIGNIFICANT CHANGE UP (ref 13–17)
IMM GRANULOCYTES NFR BLD AUTO: 0.3 % — SIGNIFICANT CHANGE UP (ref 0–1.5)
LYMPHOCYTES # BLD AUTO: 2.65 K/UL — SIGNIFICANT CHANGE UP (ref 1–3.3)
LYMPHOCYTES # BLD AUTO: 27.7 % — SIGNIFICANT CHANGE UP (ref 13–44)
MCHC RBC-ENTMCNC: 23.6 PG — LOW (ref 27–34)
MCHC RBC-ENTMCNC: 32.2 G/DL — SIGNIFICANT CHANGE UP (ref 32–36)
MCV RBC AUTO: 73.3 FL — LOW (ref 80–100)
MONOCYTES # BLD AUTO: 0.57 K/UL — SIGNIFICANT CHANGE UP (ref 0–0.9)
MONOCYTES NFR BLD AUTO: 6 % — SIGNIFICANT CHANGE UP (ref 2–14)
NEUTROPHILS # BLD AUTO: 6.14 K/UL — SIGNIFICANT CHANGE UP (ref 1.8–7.4)
NEUTROPHILS NFR BLD AUTO: 64.2 % — SIGNIFICANT CHANGE UP (ref 43–77)
NRBC # BLD: 0 /100 WBCS — SIGNIFICANT CHANGE UP (ref 0–0)
PLATELET # BLD AUTO: 201 K/UL — SIGNIFICANT CHANGE UP (ref 150–400)
RBC # BLD: 5.51 M/UL — SIGNIFICANT CHANGE UP (ref 4.2–5.8)
RBC # FLD: 21.6 % — HIGH (ref 10.3–14.5)
WBC # BLD: 9.56 K/UL — SIGNIFICANT CHANGE UP (ref 3.8–10.5)
WBC # FLD AUTO: 9.56 K/UL — SIGNIFICANT CHANGE UP (ref 3.8–10.5)

## 2022-06-23 PROCEDURE — 99214 OFFICE O/P EST MOD 30 MIN: CPT

## 2022-06-24 LAB
FERRITIN SERPL-MCNC: 43 NG/ML
IRON SATN MFR SERPL: 19 %
IRON SERPL-MCNC: 62 UG/DL
TIBC SERPL-MCNC: 329 UG/DL
UIBC SERPL-MCNC: 267 UG/DL

## 2022-06-24 NOTE — REASON FOR VISIT
[Blood Count Assessment] : blood count assessment [Parent] : parent [Follow-Up Visit] : a follow-up visit for [FreeTextEntry2] : Anemia

## 2022-06-24 NOTE — RESULTS/DATA
[FreeTextEntry1] : 03/09/CBC WBC 8.40 HGB 8.5 HCT 28.0 ,000 MCV 75.9 RDW 22.5%\par low serum ferritin 4 low iron\par \par 04/2/12022 CBC - WBC = 9.04, Hgb = 11.2, HCT = 37.6, PLT = 253, MCV = 71.8. \par Serum Fe = 325, Ferritin = 23, TIBC = 351, %Sat = 92% \par \par  5/27/2022 CT scan of Abdomen & Pelvis (Outside Facility) -  shows small hiatal hernia, 1.8 cm Right adrenal nodule. \par \par 06/23/22- CBC - WBC = 9.56, Hgb = 13.0, HCT = 40.4, PLT = 201, MCV = 73.3\par Serum Ferritin = 43 (low normal range), TIBC = 329, Iron = 62,  % Saturation = 19%\par

## 2022-06-24 NOTE — CONSULT LETTER
[Consult Closing:] : Thank you very much for allowing me to participate in the care of this patient.  If you have any questions, please do not hesitate to contact me. [DrRoberto  ___] : Dr. PÉRZE [FreeTextEntry2] : PCP: Dr. Alex Wright - [address] [facility] [address] - Phone: [phone]\par GI - Dr Diego Rosario  [address] [facility], [address], [facility] [address] [phone]\par Fax: [phone] [FreeTextEntry3] : GI - Dr Diego Rosario  [address] [place] [address], [address], [facility] [address] [phone]\par Fax: [phone]

## 2022-06-24 NOTE — REVIEW OF SYSTEMS
[Fatigue] : fatigue [Negative] : Allergic/Immunologic [Confused] : no confusion [Dizziness] : no dizziness [Fainting] : no fainting [Difficulty Walking] : no difficulty walking [Insomnia] : no insomnia [Anxiety] : no anxiety [Depression] : no depression [de-identified] : autistic [de-identified] : disinterested in visit; cooperative; tangental speech

## 2022-06-24 NOTE — HISTORY OF PRESENT ILLNESS
[90: Able to carry normal activity; minor signs or symptoms of disease.] : 90: Able to carry normal activity; minor signs or symptoms of disease.  [ECOG Performance Status: 0 - Fully active, able to carry on all pre-disease performance without restriction] : Performance Status: 0 - Fully active, able to carry on all pre-disease performance without restriction [Date: ____________] : Patient's last distress assessment performed on [unfilled]. [1 - Distress Level] : Distress Level: 1 [de-identified] : Initial Consultation on 3/9/2022\par Referred by: [facility]\par Accompanied by: Mother\par CC: Anemia\par \par HPI:\par 41 year old man with history of GERD and Aspergers here for evaluation of anemia.  Patient reports a two week history of generalized malaise, intermittent headaches and one episode of brown emesis and two other episodes of dry heaving.  He was evaluated by his primary care physician on 2/25/2022 and found to have a hemoglobin of 6.2.  He was advised to go to ED for evaluation.  He was given 2 units of packed RBCs to bring his HGB/HCT to 7.4/24.2 respectively.  Patient refused further work-up in the hospital.  He saw GI and underwent an endoscopy on 3/3/2022 that showed no source of bleeding.  He refused colonoscopy. Patient denies taking oral or IV iron. \par \par Patient c/o intermittent fatigue.  He reports a good appetite without recent weight loss.  Patient denies any fever/chills, recent infections, CP, SOB, palpitations, abdominal pain, current reflux symptoms, further n/v/d, bloody/black stools, frequent headaches or dizziness.\par \par PMHx:\par Chronic prostatitis\par GERD\par Aspergers\par \par PSHx:\par Hernia repair\par \par Hospitalizations:\par ED visit 2/25-2/26/2022\par \par Medications:\par See List.  Medications reconciled\par Omeprazole BID\par \par Allergies:\par Amoxicillin\par Grass, Trees, Mold\par \par Social History:\par Denies children. \par Does not work. \par Denies alcohol or smoking use. Lives with parents. \par Eats regular diet \par [place]  \par Family History:\par Denies family history of anemia, leukemia, lymphoma or other blood disorders. \par \par Healthcare Maintenance:\par Primary care doctor- Dr. Alex Wright- last seen 2/23/2022 \par Denies colonoscopy\par Last endoscopy- 3/3/2022\par Received three doses of COVID vaccine\par Denies history of COVID infection [de-identified] : 06/23/2022 - Patient seen in a follow up visit today accompanied by mother Aleyda. Patient was feeling well did report any complains. He denies any bleeding, bruising, weigh loss, fever, chills, night sweats, abdominal pain, constipation, diarrhea. Reports compliance with oral iron therapy but doesn't want to take concurrent Vitamin C or Orange juice. States he doesn't like the taste. He also does not want to eat leafy greens with citrus dressing states he doesn't like that food. \par Patient had a URI few weeks ago and was on Z pack for a few days recovered without any residual complains. \par Mom Aleyda stated patient was seen by his GI and patient is still refusing to undergo Colonoscopy for evaluation of his anemia.\par  Patient had a CT scan of Abdomen & Pelvis done 5/27/2022 shows small hiatal hernia, 1.8 cm Right adrenal nodule. He is referred to Endocrinology for further evaluation.

## 2022-06-24 NOTE — PHYSICAL EXAM
[Fully active, able to carry on all pre-disease performance without restriction] : Status 0 - Fully active, able to carry on all pre-disease performance without restriction [Normal] : affect appropriate [de-identified] : No cervical, axillary or inguinal LAD noted

## 2022-06-24 NOTE — ASSESSMENT
[Supportive] : Goals of care discussed with patient: Supportive [Palliative Care Plan] : not applicable at this time [FreeTextEntry1] : 41 year old man with history of GERD and Aspergers here for evaluation of anemia.  Patient was recently seen in ED for HGB of 6.2 after a two week history of malaise, nausea and brown emesis.   He was given 2 units of packed RBCs to bring his HGB/HCT to 7.4/24.2 respectively.  Patient refused further work-up in the hospital.  He saw GI and underwent an endoscopy on 3/3/2022 that showed no source of bleeding.  He refused colonoscopy.\par \par Plan:\par Will check CBC, CMP, LFTs, iron studies, Ferritin, B12, Folic Acid, LDH, haptoglobin, reticulocyte count and hemoglobin electrophoresis. \par Will consider CT abdomen/pelvis if work-up unrevealing. \par Case and management discussed with Dr. Ochoa. \par \par 04/21/2022 according to the patient"... I feel back to normal..." mother tells us that he has been taking oral iron. He has refused other outpatient evaluation for GI blood loss which is now thought to be a gastritis. We will check blood levels today and he does not appear to have signs of orthostasis in vital signs as he is moving well and has normal energy levels. Patient continues to refuse Colonscopy procedure as recommended. \par recommend to continue taking Iron PO as prescribed and to take with Vitamin C (pt unwilling to take with Citrus fruits due to GERD). \par Patient seen and examined with Darby DARLING.\par Not planning on blood transfusion unless absolutely normal\par RTC in 8 weeks\par \par 06/23/2022 - Patient seen for a follow up visit for Blood count assessment and Fe deficiency Anemia. \par He feels better on oral iron and reports compliance and no bleeding. He still refuses Colonoscopy evaluation. \par CT Abd/ Pelvis done at outside facility shows a Small hiatal hernia & a 1.8 cm Right adrenal nodule - patient referred to Endocrinology. \par CBC shows his Hgb / HCT are normal but his MCV is still low. Serum Ferritin, TIBC, Iron levels results are pending.\par Patient to continue oral iron therapy 2 tabs every other day with Vitamin C, citrus to improve Fe absorption and eat iron rich foods red meat. \par Medication compliance & Physician recommendations was emphasized. \par RTC in 4 months. \par \par 06/23/22- CBC - WBC = 9.56, Hgb = 13.0, HCT = 40.4, PLT = 201, MCV = 73.3\par Serum Ferritin = 43 (low normal range), TIBC = 329, Iron = 62,  % Saturation = 19%\par \par

## 2024-01-21 ENCOUNTER — EMERGENCY (EMERGENCY)
Facility: HOSPITAL | Age: 44
LOS: 1 days | Discharge: HOME | End: 2024-01-21
Attending: EMERGENCY MEDICINE
Payer: MEDICARE

## 2024-01-21 VITALS
HEART RATE: 72 BPM | OXYGEN SATURATION: 97 % | RESPIRATION RATE: 17 BRPM | DIASTOLIC BLOOD PRESSURE: 87 MMHG | TEMPERATURE: 99 F | SYSTOLIC BLOOD PRESSURE: 128 MMHG

## 2024-01-21 VITALS
HEIGHT: 72 IN | OXYGEN SATURATION: 93 % | HEART RATE: 109 BPM | DIASTOLIC BLOOD PRESSURE: 139 MMHG | WEIGHT: 250 LBS | TEMPERATURE: 98 F | SYSTOLIC BLOOD PRESSURE: 171 MMHG | RESPIRATION RATE: 20 BRPM

## 2024-01-21 LAB
ALBUMIN SERPL ELPH-MCNC: 4.6 G/DL — SIGNIFICANT CHANGE UP (ref 3.3–5)
ALP SERPL-CCNC: 64 U/L — SIGNIFICANT CHANGE UP (ref 40–120)
ALT FLD-CCNC: 48 U/L — HIGH (ref 10–45)
ANION GAP SERPL CALC-SCNC: 16 MMOL/L — SIGNIFICANT CHANGE UP (ref 5–17)
APPEARANCE UR: CLEAR — SIGNIFICANT CHANGE UP
AST SERPL-CCNC: 22 U/L — SIGNIFICANT CHANGE UP (ref 10–40)
BACTERIA # UR AUTO: NEGATIVE /HPF — SIGNIFICANT CHANGE UP
BASE EXCESS BLDV CALC-SCNC: 0.7 MMOL/L — SIGNIFICANT CHANGE UP (ref -2–3)
BASOPHILS # BLD AUTO: 0.07 K/UL — SIGNIFICANT CHANGE UP (ref 0–0.2)
BASOPHILS NFR BLD AUTO: 0.5 % — SIGNIFICANT CHANGE UP (ref 0–2)
BILIRUB SERPL-MCNC: 0.4 MG/DL — SIGNIFICANT CHANGE UP (ref 0.2–1.2)
BILIRUB UR-MCNC: NEGATIVE — SIGNIFICANT CHANGE UP
BUN SERPL-MCNC: 26 MG/DL — HIGH (ref 7–23)
CA-I SERPL-SCNC: 1.29 MMOL/L — SIGNIFICANT CHANGE UP (ref 1.15–1.33)
CALCIUM SERPL-MCNC: 9.8 MG/DL — SIGNIFICANT CHANGE UP (ref 8.4–10.5)
CAST: 3 /LPF — SIGNIFICANT CHANGE UP (ref 0–4)
CHLORIDE BLDV-SCNC: 105 MMOL/L — SIGNIFICANT CHANGE UP (ref 96–108)
CHLORIDE SERPL-SCNC: 104 MMOL/L — SIGNIFICANT CHANGE UP (ref 96–108)
CO2 BLDV-SCNC: 29 MMOL/L — HIGH (ref 22–26)
CO2 SERPL-SCNC: 24 MMOL/L — SIGNIFICANT CHANGE UP (ref 22–31)
COLOR SPEC: YELLOW — SIGNIFICANT CHANGE UP
CREAT SERPL-MCNC: 1.36 MG/DL — HIGH (ref 0.5–1.3)
DIFF PNL FLD: ABNORMAL
EGFR: 67 ML/MIN/1.73M2 — SIGNIFICANT CHANGE UP
EOSINOPHIL # BLD AUTO: 0.08 K/UL — SIGNIFICANT CHANGE UP (ref 0–0.5)
EOSINOPHIL NFR BLD AUTO: 0.6 % — SIGNIFICANT CHANGE UP (ref 0–6)
FLUAV AG NPH QL: SIGNIFICANT CHANGE UP
FLUBV AG NPH QL: SIGNIFICANT CHANGE UP
GAS PNL BLDV: 138 MMOL/L — SIGNIFICANT CHANGE UP (ref 136–145)
GAS PNL BLDV: SIGNIFICANT CHANGE UP
GAS PNL BLDV: SIGNIFICANT CHANGE UP
GLUCOSE BLDV-MCNC: 131 MG/DL — HIGH (ref 70–99)
GLUCOSE SERPL-MCNC: 130 MG/DL — HIGH (ref 70–99)
GLUCOSE UR QL: NEGATIVE MG/DL — SIGNIFICANT CHANGE UP
HCO3 BLDV-SCNC: 27 MMOL/L — SIGNIFICANT CHANGE UP (ref 22–29)
HCT VFR BLD CALC: 44.7 % — SIGNIFICANT CHANGE UP (ref 39–50)
HCT VFR BLDA CALC: 43 % — SIGNIFICANT CHANGE UP (ref 39–51)
HGB BLD CALC-MCNC: 14.4 G/DL — SIGNIFICANT CHANGE UP (ref 12.6–17.4)
HGB BLD-MCNC: 14.5 G/DL — SIGNIFICANT CHANGE UP (ref 13–17)
IMM GRANULOCYTES NFR BLD AUTO: 1.2 % — HIGH (ref 0–0.9)
KETONES UR-MCNC: NEGATIVE MG/DL — SIGNIFICANT CHANGE UP
LACTATE BLDV-MCNC: 2.1 MMOL/L — HIGH (ref 0.5–2)
LEUKOCYTE ESTERASE UR-ACNC: NEGATIVE — SIGNIFICANT CHANGE UP
LIDOCAIN IGE QN: 26 U/L — SIGNIFICANT CHANGE UP (ref 7–60)
LYMPHOCYTES # BLD AUTO: 16.1 % — SIGNIFICANT CHANGE UP (ref 13–44)
LYMPHOCYTES # BLD AUTO: 2.29 K/UL — SIGNIFICANT CHANGE UP (ref 1–3.3)
MCHC RBC-ENTMCNC: 26.8 PG — LOW (ref 27–34)
MCHC RBC-ENTMCNC: 32.4 GM/DL — SIGNIFICANT CHANGE UP (ref 32–36)
MCV RBC AUTO: 82.6 FL — SIGNIFICANT CHANGE UP (ref 80–100)
MONOCYTES # BLD AUTO: 0.67 K/UL — SIGNIFICANT CHANGE UP (ref 0–0.9)
MONOCYTES NFR BLD AUTO: 4.7 % — SIGNIFICANT CHANGE UP (ref 2–14)
MUCOUS THREADS # UR AUTO: PRESENT
NEUTROPHILS # BLD AUTO: 10.93 K/UL — HIGH (ref 1.8–7.4)
NEUTROPHILS NFR BLD AUTO: 76.9 % — SIGNIFICANT CHANGE UP (ref 43–77)
NITRITE UR-MCNC: NEGATIVE — SIGNIFICANT CHANGE UP
NRBC # BLD: 0 /100 WBCS — SIGNIFICANT CHANGE UP (ref 0–0)
PCO2 BLDV: 49 MMHG — SIGNIFICANT CHANGE UP (ref 42–55)
PH BLDV: 7.35 — SIGNIFICANT CHANGE UP (ref 7.32–7.43)
PH UR: 5 — SIGNIFICANT CHANGE UP (ref 5–8)
PLATELET # BLD AUTO: 228 K/UL — SIGNIFICANT CHANGE UP (ref 150–400)
PO2 BLDV: 35 MMHG — SIGNIFICANT CHANGE UP (ref 25–45)
POTASSIUM BLDV-SCNC: 4 MMOL/L — SIGNIFICANT CHANGE UP (ref 3.5–5.1)
POTASSIUM SERPL-MCNC: 4 MMOL/L — SIGNIFICANT CHANGE UP (ref 3.5–5.3)
POTASSIUM SERPL-SCNC: 4 MMOL/L — SIGNIFICANT CHANGE UP (ref 3.5–5.3)
PROT SERPL-MCNC: 7.7 G/DL — SIGNIFICANT CHANGE UP (ref 6–8.3)
PROT UR-MCNC: 30 MG/DL
RBC # BLD: 5.41 M/UL — SIGNIFICANT CHANGE UP (ref 4.2–5.8)
RBC # FLD: 13.3 % — SIGNIFICANT CHANGE UP (ref 10.3–14.5)
RBC CASTS # UR COMP ASSIST: 2 /HPF — SIGNIFICANT CHANGE UP (ref 0–4)
REVIEW: SIGNIFICANT CHANGE UP
RSV RNA NPH QL NAA+NON-PROBE: SIGNIFICANT CHANGE UP
SAO2 % BLDV: 60.7 % — LOW (ref 67–88)
SARS-COV-2 RNA SPEC QL NAA+PROBE: DETECTED
SODIUM SERPL-SCNC: 144 MMOL/L — SIGNIFICANT CHANGE UP (ref 135–145)
SP GR SPEC: >1.03 — HIGH (ref 1–1.03)
SQUAMOUS # UR AUTO: 0 /HPF — SIGNIFICANT CHANGE UP (ref 0–5)
UROBILINOGEN FLD QL: 0.2 MG/DL — SIGNIFICANT CHANGE UP (ref 0.2–1)
WBC # BLD: 14.21 K/UL — HIGH (ref 3.8–10.5)
WBC # FLD AUTO: 14.21 K/UL — HIGH (ref 3.8–10.5)
WBC UR QL: 0 /HPF — SIGNIFICANT CHANGE UP (ref 0–5)

## 2024-01-21 PROCEDURE — 83690 ASSAY OF LIPASE: CPT

## 2024-01-21 PROCEDURE — 96375 TX/PRO/DX INJ NEW DRUG ADDON: CPT

## 2024-01-21 PROCEDURE — 99285 EMERGENCY DEPT VISIT HI MDM: CPT | Mod: FS

## 2024-01-21 PROCEDURE — 82330 ASSAY OF CALCIUM: CPT

## 2024-01-21 PROCEDURE — 76705 ECHO EXAM OF ABDOMEN: CPT | Mod: 26

## 2024-01-21 PROCEDURE — 82435 ASSAY OF BLOOD CHLORIDE: CPT

## 2024-01-21 PROCEDURE — 83605 ASSAY OF LACTIC ACID: CPT

## 2024-01-21 PROCEDURE — 74177 CT ABD & PELVIS W/CONTRAST: CPT | Mod: 26,MA

## 2024-01-21 PROCEDURE — 87637 SARSCOV2&INF A&B&RSV AMP PRB: CPT

## 2024-01-21 PROCEDURE — 99285 EMERGENCY DEPT VISIT HI MDM: CPT | Mod: 25

## 2024-01-21 PROCEDURE — 84295 ASSAY OF SERUM SODIUM: CPT

## 2024-01-21 PROCEDURE — 93005 ELECTROCARDIOGRAM TRACING: CPT

## 2024-01-21 PROCEDURE — 85014 HEMATOCRIT: CPT

## 2024-01-21 PROCEDURE — 82803 BLOOD GASES ANY COMBINATION: CPT

## 2024-01-21 PROCEDURE — 80053 COMPREHEN METABOLIC PANEL: CPT

## 2024-01-21 PROCEDURE — 84132 ASSAY OF SERUM POTASSIUM: CPT

## 2024-01-21 PROCEDURE — 85018 HEMOGLOBIN: CPT

## 2024-01-21 PROCEDURE — 96374 THER/PROPH/DIAG INJ IV PUSH: CPT | Mod: XU

## 2024-01-21 PROCEDURE — 82947 ASSAY GLUCOSE BLOOD QUANT: CPT

## 2024-01-21 PROCEDURE — 85025 COMPLETE CBC W/AUTO DIFF WBC: CPT

## 2024-01-21 PROCEDURE — 74177 CT ABD & PELVIS W/CONTRAST: CPT | Mod: MA

## 2024-01-21 PROCEDURE — 76705 ECHO EXAM OF ABDOMEN: CPT

## 2024-01-21 PROCEDURE — 81001 URINALYSIS AUTO W/SCOPE: CPT

## 2024-01-21 RX ORDER — KETOROLAC TROMETHAMINE 30 MG/ML
15 SYRINGE (ML) INJECTION ONCE
Refills: 0 | Status: COMPLETED | OUTPATIENT
Start: 2024-01-21 | End: 2024-01-21

## 2024-01-21 RX ORDER — ACETAMINOPHEN 500 MG
1000 TABLET ORAL ONCE
Refills: 0 | Status: COMPLETED | OUTPATIENT
Start: 2024-01-21 | End: 2024-01-21

## 2024-01-21 RX ORDER — SODIUM CHLORIDE 9 MG/ML
1000 INJECTION, SOLUTION INTRAVENOUS ONCE
Refills: 0 | Status: COMPLETED | OUTPATIENT
Start: 2024-01-21 | End: 2024-01-21

## 2024-01-21 RX ORDER — ONDANSETRON 8 MG/1
1 TABLET, FILM COATED ORAL
Qty: 15 | Refills: 0
Start: 2024-01-21 | End: 2024-01-25

## 2024-01-21 RX ORDER — TAMSULOSIN HYDROCHLORIDE 0.4 MG/1
1 CAPSULE ORAL
Qty: 7 | Refills: 0
Start: 2024-01-21 | End: 2024-01-27

## 2024-01-21 RX ORDER — ONDANSETRON 8 MG/1
4 TABLET, FILM COATED ORAL ONCE
Refills: 0 | Status: COMPLETED | OUTPATIENT
Start: 2024-01-21 | End: 2024-01-21

## 2024-01-21 RX ADMIN — SODIUM CHLORIDE 1000 MILLILITER(S): 9 INJECTION, SOLUTION INTRAVENOUS at 08:21

## 2024-01-21 RX ADMIN — Medication 400 MILLIGRAM(S): at 08:29

## 2024-01-21 RX ADMIN — ONDANSETRON 4 MILLIGRAM(S): 8 TABLET, FILM COATED ORAL at 08:22

## 2024-01-21 NOTE — ED PROVIDER NOTE - NSFOLLOWUPINSTRUCTIONS_ED_ALL_ED_FT
Follow up with your Primary Care Physician within the next 2-3 days and to continue to monitor the adrenal nodule    Bring a copy of your test results with you to your appointment    Take Tylenol 1000mg every 6 hours as needed for pain and alternate with NSAIDs such as Motrin, Aleve, Advil 400mg every 8 hours with food    Take Flomax at bedtime to pass stone    Take zofran every 8 hours as needed for nausea     Return to the Emergency Room if you experience new or worsening symptoms abdominal pain, nausea, vomiting, fever chills, difficulty voiding     Urology   Address: [address] [place] [address] [id number], [place] [address]  Phone: [phone]

## 2024-01-21 NOTE — ED ADULT NURSE NOTE - OBJECTIVE STATEMENT
42 yo Male with PMH of ASD coming from home with LLQ abd pain since 5am this am radiating to his left buttock. Pt cannot recall last BM. Pt A&Ox3 speaking coherently with spontaneous unlabored respirations, denies chest pain/SOB, abd soft and nontender, pt moving all extremities without difficulty. Denies recent travel or sick contacts. Pt c/o of 8/10 pain, IV tylenol given. 44 yo Male with PMH of ASD coming from home with LLQ abd pain since 5am this am radiating to his left buttock. Pt cannot recall last BM. Pt A&Ox3 speaking coherently with spontaneous unlabored respirations, denies chest pain/SOB, abd soft and nontender, pt moving all extremities without difficulty. Denies recent travel or sick contacts. Pt c/o of 8/10 pain, IV tylenol given. Mom at the bedside. Bed in lowest position, comfort measures maintained.

## 2024-01-21 NOTE — ED PROVIDER NOTE - PROGRESS NOTE DETAILS
Attending Malini Antonoi: ct with evidence of ureteral stone and adrenal nodule. pt feeling better. UA negative for infection. d/w pt adrenal nodule and need to follow up. PHONG Bennett: patient and mother aware of adrenal nodule and had CT scan for adrenal nodule two years ago and aware to continue to monitor with PCP. Pain well controlled and comfortable with Tylenol/NSAIDs. will recommended urology f/u Attending Malini Antonio: ct with evidence of ureteral stone and adrenal nodule. pt feeling better. UA negative for infection.

## 2024-01-21 NOTE — ED PROVIDER NOTE - OBJECTIVE STATEMENT
Attending Malini Antonio: 44 yo male presenting with abdominal pain. Abdominal patient states around 5 AM developed pain in his left lower quadrant.  Pain has been constant since starting.  Patient has associated nausea but no vomiting.  Patient also has not patient had a bowel movement.  No diarrhea.  No back pain.  Has been having a cough but was diagnosed with COVID about a week ago and has been steadily improving.  No fevers or chills.  Patient has had prior hernia repair as an infant.  Patient denies any testicle pain.  Denies any dysuria.  No recent falls or trauma.  No lightheadedness or dizziness.

## 2024-01-21 NOTE — ED PROVIDER NOTE - PATIENT PORTAL LINK FT
You can access the FollowMyHealth Patient Portal offered by [facility] by registering at the following website: [email or web address]. By joining [employer]’s FollowMyHealth portal, you will also be able to view your health information using other applications (apps) compatible with our system.

## 2024-01-21 NOTE — ED ADULT NURSE NOTE - NSFALLUNIVINTERV_ED_ALL_ED
Bed/Stretcher in lowest position, wheels locked, appropriate side rails in place/Call bell, personal items and telephone in reach/Instruct patient to call for assistance before getting out of bed/chair/stretcher/Non-slip footwear applied when patient is off stretcher/[place] to call system/Physically safe environment - no spills, clutter or unnecessary equipment/Purposeful proactive rounding/Room/bathroom lighting operational, light cord in reach

## 2024-01-21 NOTE — ED PROVIDER NOTE - ATTENDING APP SHARED VISIT CONTRIBUTION OF CARE
Attending MD Malini Antonio:  I personally made/approved the management plan and take responsibility for the patient management.  Physician assistant note reviewed and agree on plan of care and except where noted.  See HPI, PE, and MDM for details.

## 2024-01-21 NOTE — ED PROVIDER NOTE - CLINICAL SUMMARY MEDICAL DECISION MAKING FREE TEXT BOX
Attending Malini Antonio: 43-year-old male presenting with abdominal pain with associated nausea.  Symptoms started this a.m.  Upon arrival patient mildly tachycardic with stable blood pressure.  On exam patient with left lower quadrant tenderness to palpation.  No testicular pain and patient denies any pain to his testicles making testicular torsion less likely.  No drainage from his penis and no known history of STD.  Concern for possible diverticulitis versus colitis versus ureteral stone.  Will obtain labs, CT scan of the abdomen pelvis, and reeval. GOAL: Pt will perform sit to stand, bed <-> chair, toilet transfers with I in 2 weeks

## 2024-01-21 NOTE — ED PROVIDER NOTE - PHYSICAL EXAMINATION
Attending Malini Antonio: Gen: NAD, heent: atrauamtic, eomi, perrla, mmm, op pink, uvula midline, neck; nttp, no nuchal rigidity, chest: nttp, no crepitus, cv: rrr, no murmurs, lungs: ctab, abd: soft, ttp LLQ, no peritoneal signs, +BS, no guarding, ext: wwp, neg homans, skin: no rash, neuro: awake and alert, following commands, speech clear, sensation and strength intact, no focal deficits GUl no testicular pain